# Patient Record
Sex: FEMALE | Race: BLACK OR AFRICAN AMERICAN | NOT HISPANIC OR LATINO | Employment: FULL TIME | URBAN - METROPOLITAN AREA
[De-identification: names, ages, dates, MRNs, and addresses within clinical notes are randomized per-mention and may not be internally consistent; named-entity substitution may affect disease eponyms.]

---

## 2023-10-13 ENCOUNTER — APPOINTMENT (OUTPATIENT)
Dept: GENERAL RADIOLOGY | Facility: CLINIC | Age: 18
End: 2023-10-13
Attending: EMERGENCY MEDICINE
Payer: OTHER GOVERNMENT

## 2023-10-13 ENCOUNTER — HOSPITAL ENCOUNTER (EMERGENCY)
Facility: CLINIC | Age: 18
Discharge: HOME | End: 2023-10-13
Attending: EMERGENCY MEDICINE | Admitting: EMERGENCY MEDICINE
Payer: OTHER GOVERNMENT

## 2023-10-13 VITALS
BODY MASS INDEX: 29.99 KG/M2 | RESPIRATION RATE: 15 BRPM | OXYGEN SATURATION: 100 % | HEIGHT: 65 IN | DIASTOLIC BLOOD PRESSURE: 81 MMHG | WEIGHT: 180 LBS | SYSTOLIC BLOOD PRESSURE: 132 MMHG | TEMPERATURE: 98 F | HEART RATE: 78 BPM

## 2023-10-13 DIAGNOSIS — R07.9 CHEST PAIN, UNSPECIFIED TYPE: ICD-10-CM

## 2023-10-13 LAB
ANION GAP SERPL CALCULATED.3IONS-SCNC: 13 MMOL/L (ref 7–15)
ATRIAL RATE - MUSE: 92 BPM
BASO+EOS+MONOS # BLD AUTO: NORMAL 10*3/UL
BASO+EOS+MONOS NFR BLD AUTO: NORMAL %
BASOPHILS # BLD AUTO: 0 10E3/UL (ref 0–0.2)
BASOPHILS NFR BLD AUTO: 0 %
BUN SERPL-MCNC: 7.4 MG/DL (ref 6–20)
CALCIUM SERPL-MCNC: 9.3 MG/DL (ref 8.6–10)
CHLORIDE SERPL-SCNC: 101 MMOL/L (ref 98–107)
CREAT SERPL-MCNC: 1.06 MG/DL (ref 0.51–0.95)
D DIMER PPP FEU-MCNC: 0.37 UG/ML FEU (ref 0–0.5)
DEPRECATED HCO3 PLAS-SCNC: 24 MMOL/L (ref 22–29)
DIASTOLIC BLOOD PRESSURE - MUSE: NORMAL MMHG
EGFRCR SERPLBLD CKD-EPI 2021: 77 ML/MIN/1.73M2
EOSINOPHIL # BLD AUTO: 0.1 10E3/UL (ref 0–0.7)
EOSINOPHIL NFR BLD AUTO: 1 %
ERYTHROCYTE [DISTWIDTH] IN BLOOD BY AUTOMATED COUNT: 12.8 % (ref 10–15)
GLUCOSE SERPL-MCNC: 106 MG/DL (ref 70–99)
HCG SERPL QL: NEGATIVE
HCT VFR BLD AUTO: 37.6 % (ref 35–47)
HGB BLD-MCNC: 12.7 G/DL (ref 11.7–15.7)
IMM GRANULOCYTES # BLD: 0 10E3/UL
IMM GRANULOCYTES NFR BLD: 0 %
INTERPRETATION ECG - MUSE: NORMAL
LYMPHOCYTES # BLD AUTO: 2.2 10E3/UL (ref 0.8–5.3)
LYMPHOCYTES NFR BLD AUTO: 44 %
MAGNESIUM SERPL-MCNC: 1.8 MG/DL (ref 1.7–2.3)
MCH RBC QN AUTO: 29.2 PG (ref 26.5–33)
MCHC RBC AUTO-ENTMCNC: 33.8 G/DL (ref 31.5–36.5)
MCV RBC AUTO: 86 FL (ref 78–100)
MONOCYTES # BLD AUTO: 0.7 10E3/UL (ref 0–1.3)
MONOCYTES NFR BLD AUTO: 13 %
NEUTROPHILS # BLD AUTO: 2.1 10E3/UL (ref 1.6–8.3)
NEUTROPHILS NFR BLD AUTO: 42 %
NRBC # BLD AUTO: 0 10E3/UL
NRBC BLD AUTO-RTO: 0 /100
P AXIS - MUSE: 77 DEGREES
PLATELET # BLD AUTO: 224 10E3/UL (ref 150–450)
POTASSIUM SERPL-SCNC: 4.3 MMOL/L (ref 3.4–5.3)
PR INTERVAL - MUSE: 238 MS
QRS DURATION - MUSE: 76 MS
QT - MUSE: 358 MS
QTC - MUSE: 442 MS
R AXIS - MUSE: 87 DEGREES
RBC # BLD AUTO: 4.35 10E6/UL (ref 3.8–5.2)
SODIUM SERPL-SCNC: 138 MMOL/L (ref 135–145)
SYSTOLIC BLOOD PRESSURE - MUSE: NORMAL MMHG
T AXIS - MUSE: 73 DEGREES
TROPONIN T SERPL HS-MCNC: <6 NG/L
VENTRICULAR RATE- MUSE: 92 BPM
WBC # BLD AUTO: 5.1 10E3/UL (ref 4–11)

## 2023-10-13 PROCEDURE — 80048 BASIC METABOLIC PNL TOTAL CA: CPT | Performed by: EMERGENCY MEDICINE

## 2023-10-13 PROCEDURE — 85025 COMPLETE CBC W/AUTO DIFF WBC: CPT | Performed by: EMERGENCY MEDICINE

## 2023-10-13 PROCEDURE — 36415 COLL VENOUS BLD VENIPUNCTURE: CPT | Performed by: EMERGENCY MEDICINE

## 2023-10-13 PROCEDURE — 83735 ASSAY OF MAGNESIUM: CPT | Performed by: EMERGENCY MEDICINE

## 2023-10-13 PROCEDURE — 84484 ASSAY OF TROPONIN QUANT: CPT | Performed by: EMERGENCY MEDICINE

## 2023-10-13 PROCEDURE — 85379 FIBRIN DEGRADATION QUANT: CPT | Performed by: EMERGENCY MEDICINE

## 2023-10-13 PROCEDURE — 71046 X-RAY EXAM CHEST 2 VIEWS: CPT

## 2023-10-13 PROCEDURE — 84703 CHORIONIC GONADOTROPIN ASSAY: CPT | Performed by: EMERGENCY MEDICINE

## 2023-10-13 PROCEDURE — 93005 ELECTROCARDIOGRAM TRACING: CPT

## 2023-10-13 PROCEDURE — 99285 EMERGENCY DEPT VISIT HI MDM: CPT | Mod: 25

## 2023-10-13 ASSESSMENT — ACTIVITIES OF DAILY LIVING (ADL): ADLS_ACUITY_SCORE: 35

## 2023-10-13 NOTE — Clinical Note
Andra Kaufman was seen and treated in our emergency department on 10/13/2023.  She may return to work on 10/16/2023.  Ms. Kaufman was seen in our emergency department for a medical issue.  She may not be able to complete her  physical exam tomorrow due to this illness.  Please excuse her from this, though it can be rescheduled at any point when she is feeling better.     If you have any questions or concerns, please don't hesitate to call.      Trierweiler, Chad A, MD

## 2023-10-14 NOTE — ED PROVIDER NOTES
History     Chief Complaint:  Chest Pain (Focal left anterior costosternal CP for the last 2 hours. Worse with deep insp. And palpation. )       The history is provided by the patient and a friend (boyfriend).      Andra Kaufman is a 17 year old female presented with chest pain. The patient reports she began experiencing a stabbing chest pain about 2-3 hours ago after she came back from work. She states that it was spasms like that radiates up to her neck. She reports that she is also experiencing weakness, nausea, cough, chills, chest pressure, and LUQ abdominal pain (directly adjacent to and part of the left chest pain above). She states that she has had chest pain before but has not taken it serious until today. She reports that before she had intermittent chest tightness but today it was consistent. She states that when she takes deep respirations the chest pain improves. She denies vomiting, tongue pain, or leg swelling. The boyfriend of the patient reports that the patient took a contraceptive like Plan B 3 days ago.  He notes the patient took Theraflu and throat spray today. She adds that she has a  fitness test tomorrow and needs to know if she will be medically clear to take it. She states that she is worried her chest will start hurting during the running portion of the test.     Independent Historian:   Boyfriend reports - supplemental history.     Review of External Notes:   None    Medications:    The patient is not currently taking any prescribed medications.    Past Medical History:    No pertinent past medical history    Physical Exam   No data found.       Physical Exam  Eye:  Pupils are equal, round, and reactive.  Extraocular movements intact.    ENT:  No rhinorrhea.  Moist mucus membranes.  Normal tongue and tonsil.    Cardiac:  Regular rate and rhythm.  No murmurs, gallops, or rubs.    Pulmonary:  Clear to auscultation bilaterally.  No wheezes, rales, or rhonchi.    Abdomen:   Positive bowel sounds.  Abdomen is soft and non-distended, without focal tenderness.    Musculoskeletal:  Normal movement of all extremities without evidence for deficit.    Skin:  Warm and dry without rashes.    Neurologic:  Non-focal exam without asymmetric weakness or numbness.     Psychiatric:  Normal affect with appropriate interaction with examiner.      Emergency Department Course   ECG  ECG results from 10/13/23   EKG 12 lead     Value    Systolic Blood Pressure     Diastolic Blood Pressure     Ventricular Rate 92    Atrial Rate 92    MO Interval 238    QRS Duration 76        QTc 442    P Axis 77    R AXIS 87    T Axis 73    Interpretation ECG      Sinus rhythm with 1st degree A-V block  Otherwise normal ECG  No previous ECGs available  Confirmed by GENERATED REPORT, COMPUTER ([id number]),  Maru Ochoa ([id number]) on 10/13/2023 11:35:07 PM  ECG taken at 2024, ECG read at 2300       Imaging:  XR Chest 2 Views   Final Result   IMPRESSION: Negative chest.         Laboratory:  Labs Ordered and Resulted from Time of ED Arrival to Time of ED Departure   BASIC METABOLIC PANEL - Abnormal       Result Value    Sodium 138      Potassium 4.3      Chloride 101      Carbon Dioxide (CO2) 24      Anion Gap 13      Urea Nitrogen 7.4      Creatinine 1.06 (*)     GFR Estimate 77      Calcium 9.3      Glucose 106 (*)    TROPONIN T, HIGH SENSITIVITY - Normal    Troponin T, High Sensitivity <6     MAGNESIUM - Normal    Magnesium 1.8     HCG QUALITATIVE PREGNANCY - Normal    hCG Serum Qualitative Negative     D DIMER QUANTITATIVE - Normal    D-Dimer Quantitative 0.37     CBC WITH PLATELETS AND DIFFERENTIAL    WBC Count 5.1      RBC Count 4.35      Hemoglobin 12.7      Hematocrit 37.6      MCV 86      MCH 29.2      MCHC 33.8      RDW 12.8      Platelet Count 224      % Neutrophils 42      % Lymphocytes 44      % Monocytes 13      Mids % (Monos, Eos, Basos)        % Eosinophils 1      % Basophils 0      % Immature Granulocytes  0      NRBCs per 100 WBC 0      Absolute Neutrophils 2.1      Absolute Lymphocytes 2.2      Absolute Monocytes 0.7      Mids Abs (Monos, Eos, Basos)        Absolute Eosinophils 0.1      Absolute Basophils 0.0      Absolute Immature Granulocytes 0.0      Absolute NRBCs 0.0        Emergency Department Course & Assessments:           Interventions:  Medications - No data to display     Independent Interpretation (X-rays, CTs, rhythm strip):  None    Assessments/Consultations/Discussion of Management or Tests:  ED Course as of 10/16/23 0457   Fri Oct 13, 2023   2301 I obtained history and examined the patient as noted above.        Social Determinants of Health affecting care:   None    Disposition:  The patient was discharged to home.     Impression & Plan    Medical Decision Making:  This delightful 17-year-old presents to us with complaints of having 2 to 3 hours of chest pain.  She described it as a spasming type sensation, very short in nature without any other significant associated symptoms.  She did feel that it felt better when she took a deep breath.  Of note, she did take Plan B 3 days ago and has been feeling slightly ill from upper respiratory symptoms, taking TheraFlu.    On my exam, she appears clinically well.  Vital signs are normal.  Plan B is a progestin hormone.  However, because of her young age, sudden onset of symptoms, and changes with breathing, I elected to send a D-dimer which is fortunately negative and rules out PE.  Troponin is undetectable.  Chest x-ray is normal.  She feels improved on my reassessment.  At this juncture, I favor this representing more of a musculoskeletal cause.  It could also represent some degree of pleurisy considering that she is dealing with a mild cold.  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  Otherwise, I see no other indications for other antibiotics, medications, etc. and she will follow-up  with her primary team if not improving over the next 24 to 48 hours.      Diagnosis:    ICD-10-CM    1. Chest pain, unspecified type  R07.9            Discharge Medications:  There are no discharge medications for this patient.         Scribe Disclosure:  I, Libia Doyle, am serving as a scribe at 11:15 PM on 10/13/2023 to document services personally performed by Trierweiler, Chad A, MD based on my observations and the provider's statements to me.     10/13/2023   Trierweiler, Chad A, MD Trierweiler, Chad A, MD  10/16/23 0453

## 2023-10-14 NOTE — ED NOTES
PIT/Triage Evaluation    Patient presented with chest pain. The patient reports she began experiencing a stabbing chest pain about 2-3 hours ago after she came back from work. She states that it was spasms like. She reports that she is also experiencing weakness, nausea, cough, and LUQ abdominal pain (directly adjacent to and part of the left chest pain above). The friend of the patient reports that the patient took a contraceptive like Plan B 3 days ago. The patient denies fever, chills, leg swelling, calf pain, hemoptysis, recent surgeries, or recent long trips. She adds that she has a  fitness test tomorrow and needs to know if she will be medically clear to take it.     Exam is notable for:  General: Sitting on the ED chair  HEENT: Normocephalic, atraumatic  Cardiac: Radial pulses 2+, regular rate and rhythm  Pulm: Breathing comfortably, no accessory muscle usage, no conversational dyspnea, and lungs clear bilaterally  GI: Abdomen soft, nontender, no rigidity or guarding  MSK: No bony deformities  Skin: Warm and dry  Neuro: Moves all extremities  Psych: Pleasant mood and affect      Appropriate interventions for symptom management were initiated if applicable.  Appropriate diagnostic tests were initiated if indicated.    Important information for subsequent clinician:  Left chest pain, recent Plan B, PERC negative    I briefly evaluated the patient and developed an initial plan of care. I discussed this plan and explained that this brief interaction does not constitute a full evaluation. Patient/family understands that they should wait to be fully evaluated and discuss any test results with another clinician prior to leaving the hospital.       Angelo Shelton MD  10/13/23 [id number]